# Patient Record
Sex: MALE | Race: WHITE | NOT HISPANIC OR LATINO | Employment: OTHER | URBAN - METROPOLITAN AREA
[De-identification: names, ages, dates, MRNs, and addresses within clinical notes are randomized per-mention and may not be internally consistent; named-entity substitution may affect disease eponyms.]

---

## 2024-06-11 ENCOUNTER — DOCUMENTATION (OUTPATIENT)
Dept: TRANSPLANT | Facility: HOSPITAL | Age: 64
End: 2024-06-11
Payer: COMMERCIAL

## 2024-06-11 ENCOUNTER — TELEPHONE (OUTPATIENT)
Dept: TRANSPLANT | Facility: HOSPITAL | Age: 64
End: 2024-06-11
Payer: COMMERCIAL

## 2024-06-11 VITALS — WEIGHT: 236.99 LBS | HEIGHT: 68 IN | BODY MASS INDEX: 35.92 KG/M2

## 2024-06-11 DIAGNOSIS — Z01.818 PRE-TRANSPLANT EVALUATION FOR KIDNEY TRANSPLANT: Primary | ICD-10-CM

## 2024-06-11 NOTE — TELEPHONE ENCOUNTER
Do you have difficulty reading or writing in English?   no   What is the primary cause of your kidney disease?   High blood pressure,diabetes   Are you currently on dialysis?   yes  If yes, what days do you have your dialysis treatments?   Charles Ku,Sat  Have you received a transplant before?   no  If yes, what organ, and when and where was your transplant?   no  Have you been diagnosed with diabetes?    yes  Have you tested positive for hepatitis or HIV?   no  Have you ever been diagnosed with cancer?   no  If yes, what type of cancer, and when and where were you treated?   no  Do you have a history of a heart attack or stroke?  No  Are you currently or have you previously been seen by a mental health professional?   no  If yes, what is the name of your mental health provider?   no  Are you a current or former tobacco user?   no  Do you have history of alcohol abuse or dependence?   no  Do you have a history of illegal drug abuse or dependence?   no  Has anyone told you they're willing to donate their kidney to you?   no  Comments:   PCP-, NEPH-  Intake is complete,Evaluation scheduled for 10-4-24

## 2024-10-04 ENCOUNTER — APPOINTMENT (OUTPATIENT)
Dept: TRANSPLANT | Facility: HOSPITAL | Age: 64
End: 2024-10-04
Payer: COMMERCIAL

## 2025-01-17 ENCOUNTER — OFFICE VISIT (OUTPATIENT)
Facility: HOSPITAL | Age: 65
End: 2025-01-17
Payer: MEDICARE

## 2025-01-17 ENCOUNTER — DOCUMENTATION (OUTPATIENT)
Dept: TRANSPLANT | Facility: HOSPITAL | Age: 65
End: 2025-01-17
Payer: MEDICARE

## 2025-01-17 ENCOUNTER — APPOINTMENT (OUTPATIENT)
Facility: HOSPITAL | Age: 65
End: 2025-01-17
Payer: MEDICARE

## 2025-01-17 ENCOUNTER — SOCIAL WORK (OUTPATIENT)
Facility: HOSPITAL | Age: 65
End: 2025-01-17
Payer: MEDICARE

## 2025-01-17 ENCOUNTER — DOCUMENTATION (OUTPATIENT)
Facility: HOSPITAL | Age: 65
End: 2025-01-17

## 2025-01-17 ENCOUNTER — LAB (OUTPATIENT)
Dept: LAB | Facility: LAB | Age: 65
End: 2025-01-17
Payer: MEDICARE

## 2025-01-17 ENCOUNTER — DOCUMENTATION (OUTPATIENT)
Dept: TRANSPLANT | Facility: HOSPITAL | Age: 65
End: 2025-01-17

## 2025-01-17 ENCOUNTER — DOCUMENTATION (OUTPATIENT)
Facility: HOSPITAL | Age: 65
End: 2025-01-17
Payer: MEDICARE

## 2025-01-17 VITALS
BODY MASS INDEX: 37.62 KG/M2 | DIASTOLIC BLOOD PRESSURE: 71 MMHG | HEART RATE: 83 BPM | TEMPERATURE: 96.1 F | OXYGEN SATURATION: 95 % | WEIGHT: 248.2 LBS | HEIGHT: 68 IN | SYSTOLIC BLOOD PRESSURE: 127 MMHG

## 2025-01-17 VITALS
WEIGHT: 248.2 LBS | HEART RATE: 83 BPM | HEIGHT: 68 IN | SYSTOLIC BLOOD PRESSURE: 127 MMHG | OXYGEN SATURATION: 95 % | BODY MASS INDEX: 37.62 KG/M2 | DIASTOLIC BLOOD PRESSURE: 71 MMHG | TEMPERATURE: 96.1 F

## 2025-01-17 DIAGNOSIS — N18.6 ESRD (END STAGE RENAL DISEASE) (MULTI): ICD-10-CM

## 2025-01-17 DIAGNOSIS — Z12.5 ENCOUNTER FOR SCREENING FOR MALIGNANT NEOPLASM OF PROSTATE: ICD-10-CM

## 2025-01-17 DIAGNOSIS — R79.89 OTHER SPECIFIED ABNORMAL FINDINGS OF BLOOD CHEMISTRY: ICD-10-CM

## 2025-01-17 DIAGNOSIS — Z51.81 ENCOUNTER FOR THERAPEUTIC DRUG LEVEL MONITORING: ICD-10-CM

## 2025-01-17 DIAGNOSIS — Z01.818 PRE-TRANSPLANT EVALUATION FOR KIDNEY TRANSPLANT: Primary | ICD-10-CM

## 2025-01-17 DIAGNOSIS — Z01.818 PRE-TRANSPLANT EVALUATION FOR KIDNEY TRANSPLANT: ICD-10-CM

## 2025-01-17 DIAGNOSIS — N18.6 END STAGE RENAL DISEASE (MULTI): ICD-10-CM

## 2025-01-17 DIAGNOSIS — N18.6 ESRD (END STAGE RENAL DISEASE) (MULTI): Primary | ICD-10-CM

## 2025-01-17 LAB
ABO GROUP (TYPE) IN BLOOD: NORMAL
ALBUMIN SERPL BCP-MCNC: 4.4 G/DL (ref 3.4–5)
ALP SERPL-CCNC: 79 U/L (ref 33–136)
ALT SERPL W P-5'-P-CCNC: 24 U/L (ref 10–52)
AMYLASE SERPL-CCNC: 159 U/L (ref 29–103)
APPEARANCE UR: CLEAR
AST SERPL W P-5'-P-CCNC: 20 U/L (ref 9–39)
BILIRUB DIRECT SERPL-MCNC: 0.1 MG/DL (ref 0–0.3)
BILIRUB SERPL-MCNC: 0.5 MG/DL (ref 0–1.2)
BILIRUB UR STRIP.AUTO-MCNC: NEGATIVE MG/DL
BUN SERPL-MCNC: 69 MG/DL (ref 6–23)
C PEPTIDE SERPL-MCNC: 13.2 NG/ML (ref 0.7–3.9)
CHOLEST SERPL-MCNC: 106 MG/DL (ref 0–199)
CHOLESTEROL/HDL RATIO: 2.1
COLOR UR: ABNORMAL
CREAT SERPL-MCNC: 6.33 MG/DL (ref 0.5–1.3)
EBV EA IGG SER QL: NEGATIVE
EBV NA AB SER QL: POSITIVE
EBV VCA IGG SER IA-ACNC: POSITIVE
EBV VCA IGM SER IA-ACNC: NEGATIVE
EGFRCR SERPLBLD CKD-EPI 2021: 9 ML/MIN/1.73M*2
ERYTHROCYTE [DISTWIDTH] IN BLOOD BY AUTOMATED COUNT: 13.8 % (ref 11.5–14.5)
EST. AVERAGE GLUCOSE BLD GHB EST-MCNC: 189 MG/DL
GLUCOSE UR STRIP.AUTO-MCNC: ABNORMAL MG/DL
HBA1C MFR BLD: 8.2 %
HBV CORE AB SER QL: NONREACTIVE
HBV SURFACE AB SER-ACNC: <3.1 MIU/ML
HBV SURFACE AG SERPL QL IA: NONREACTIVE
HCT VFR BLD AUTO: 34.4 % (ref 41–52)
HCV AB SER QL: NONREACTIVE
HDLC SERPL-MCNC: 49.9 MG/DL
HGB BLD-MCNC: 11 G/DL (ref 13.5–17.5)
HIV 1+2 AB+HIV1 P24 AG SERPL QL IA: NONREACTIVE
HOLD SPECIMEN: NORMAL
HYALINE CASTS #/AREA URNS AUTO: ABNORMAL /LPF
INR PPP: 0.9 (ref 0.9–1.1)
KETONES UR STRIP.AUTO-MCNC: NEGATIVE MG/DL
LEUKOCYTE ESTERASE UR QL STRIP.AUTO: NEGATIVE
MCH RBC QN AUTO: 29.1 PG (ref 26–34)
MCHC RBC AUTO-ENTMCNC: 32 G/DL (ref 32–36)
MCV RBC AUTO: 91 FL (ref 80–100)
MUCOUS THREADS #/AREA URNS AUTO: ABNORMAL /LPF
NITRITE UR QL STRIP.AUTO: NEGATIVE
NON-HDL CHOLESTEROL: 56 MG/DL (ref 0–149)
NRBC BLD-RTO: 0 /100 WBCS (ref 0–0)
PH UR STRIP.AUTO: 6 [PH]
PHOSPHATE SERPL-MCNC: 5.3 MG/DL (ref 2.5–4.9)
PLATELET # BLD AUTO: 234 X10*3/UL (ref 150–450)
PROT SERPL-MCNC: 7.3 G/DL (ref 6.4–8.2)
PROT UR STRIP.AUTO-MCNC: ABNORMAL MG/DL
PROTHROMBIN TIME: 10.6 SECONDS (ref 9.8–12.8)
RBC # BLD AUTO: 3.78 X10*6/UL (ref 4.5–5.9)
RBC # UR STRIP.AUTO: ABNORMAL /UL
RBC #/AREA URNS AUTO: ABNORMAL /HPF
RH FACTOR (ANTIGEN D): NORMAL
SP GR UR STRIP.AUTO: 1.01
TREPONEMA PALLIDUM IGG+IGM AB [PRESENCE] IN SERUM OR PLASMA BY IMMUNOASSAY: NONREACTIVE
UROBILINOGEN UR STRIP.AUTO-MCNC: NORMAL MG/DL
VARICELLA ZOSTER IGG INDEX: 6.3 IA
VZV IGG SER QL IA: POSITIVE
WBC # BLD AUTO: 8.1 X10*3/UL (ref 4.4–11.3)
WBC #/AREA URNS AUTO: ABNORMAL /HPF

## 2025-01-17 PROCEDURE — 86901 BLOOD TYPING SEROLOGIC RH(D): CPT

## 2025-01-17 PROCEDURE — 84100 ASSAY OF PHOSPHORUS: CPT

## 2025-01-17 PROCEDURE — 81001 URINALYSIS AUTO W/SCOPE: CPT

## 2025-01-17 PROCEDURE — 86663 EPSTEIN-BARR ANTIBODY: CPT

## 2025-01-17 PROCEDURE — 84681 ASSAY OF C-PEPTIDE: CPT

## 2025-01-17 PROCEDURE — 82565 ASSAY OF CREATININE: CPT

## 2025-01-17 PROCEDURE — 87340 HEPATITIS B SURFACE AG IA: CPT

## 2025-01-17 PROCEDURE — 84520 ASSAY OF UREA NITROGEN: CPT

## 2025-01-17 PROCEDURE — 80076 HEPATIC FUNCTION PANEL: CPT

## 2025-01-17 PROCEDURE — 86706 HEP B SURFACE ANTIBODY: CPT

## 2025-01-17 PROCEDURE — 99205 OFFICE O/P NEW HI 60 MIN: CPT

## 2025-01-17 PROCEDURE — 99215 OFFICE O/P EST HI 40 MIN: CPT

## 2025-01-17 PROCEDURE — 86644 CMV ANTIBODY: CPT

## 2025-01-17 PROCEDURE — 82150 ASSAY OF AMYLASE: CPT

## 2025-01-17 PROCEDURE — 86704 HEP B CORE ANTIBODY TOTAL: CPT

## 2025-01-17 PROCEDURE — 83718 ASSAY OF LIPOPROTEIN: CPT

## 2025-01-17 PROCEDURE — 85027 COMPLETE CBC AUTOMATED: CPT

## 2025-01-17 PROCEDURE — 86803 HEPATITIS C AB TEST: CPT

## 2025-01-17 PROCEDURE — 86780 TREPONEMA PALLIDUM: CPT

## 2025-01-17 PROCEDURE — 82465 ASSAY BLD/SERUM CHOLESTEROL: CPT

## 2025-01-17 PROCEDURE — 86787 VARICELLA-ZOSTER ANTIBODY: CPT

## 2025-01-17 PROCEDURE — 80349 CANNABINOIDS NATURAL: CPT

## 2025-01-17 PROCEDURE — 86825 HLA X-MATH NON-CYTOTOXIC: CPT | Mod: OUT | Performed by: TRANSPLANT SURGERY

## 2025-01-17 PROCEDURE — 3008F BODY MASS INDEX DOCD: CPT | Performed by: STUDENT IN AN ORGANIZED HEALTH CARE EDUCATION/TRAINING PROGRAM

## 2025-01-17 PROCEDURE — 86829 HLA CLASS I/II ANTIBODY QUAL: CPT | Mod: OUT | Performed by: TRANSPLANT SURGERY

## 2025-01-17 PROCEDURE — 87389 HIV-1 AG W/HIV-1&-2 AB AG IA: CPT

## 2025-01-17 PROCEDURE — 99215 OFFICE O/P EST HI 40 MIN: CPT | Performed by: STUDENT IN AN ORGANIZED HEALTH CARE EDUCATION/TRAINING PROGRAM

## 2025-01-17 PROCEDURE — 86665 EPSTEIN-BARR CAPSID VCA: CPT

## 2025-01-17 PROCEDURE — 81379 HLA I TYPING COMPLETE HR: CPT | Mod: OUT | Performed by: TRANSPLANT SURGERY

## 2025-01-17 PROCEDURE — 83036 HEMOGLOBIN GLYCOSYLATED A1C: CPT

## 2025-01-17 PROCEDURE — 86900 BLOOD TYPING SEROLOGIC ABO: CPT

## 2025-01-17 PROCEDURE — 85610 PROTHROMBIN TIME: CPT

## 2025-01-17 PROCEDURE — 86664 EPSTEIN-BARR NUCLEAR ANTIGEN: CPT

## 2025-01-17 RX ORDER — LABETALOL 300 MG/1
300 TABLET, FILM COATED ORAL 2 TIMES DAILY
COMMUNITY

## 2025-01-17 RX ORDER — INSULIN GLARGINE 100 [IU]/ML
20 INJECTION, SOLUTION SUBCUTANEOUS EVERY 24 HOURS
COMMUNITY

## 2025-01-17 RX ORDER — ISOSORBIDE MONONITRATE 30 MG/1
30 TABLET, EXTENDED RELEASE ORAL DAILY
COMMUNITY

## 2025-01-17 RX ORDER — DULAGLUTIDE 0.75 MG/.5ML
0.75 INJECTION, SOLUTION SUBCUTANEOUS WEEKLY
COMMUNITY

## 2025-01-17 RX ORDER — ROSUVASTATIN CALCIUM 10 MG/1
10 TABLET, COATED ORAL DAILY
COMMUNITY

## 2025-01-17 RX ORDER — TORSEMIDE 20 MG/1
20 TABLET ORAL DAILY
COMMUNITY

## 2025-01-17 ASSESSMENT — PATIENT HEALTH QUESTIONNAIRE - PHQ9
1. LITTLE INTEREST OR PLEASURE IN DOING THINGS: NOT AT ALL
SUM OF ALL RESPONSES TO PHQ9 QUESTIONS 1 AND 2: 0
2. FEELING DOWN, DEPRESSED OR HOPELESS: NOT AT ALL

## 2025-01-17 ASSESSMENT — COLUMBIA-SUICIDE SEVERITY RATING SCALE - C-SSRS
2. HAVE YOU ACTUALLY HAD ANY THOUGHTS OF KILLING YOURSELF?: NO
1. IN THE PAST MONTH, HAVE YOU WISHED YOU WERE DEAD OR WISHED YOU COULD GO TO SLEEP AND NOT WAKE UP?: NO
6. HAVE YOU EVER DONE ANYTHING, STARTED TO DO ANYTHING, OR PREPARED TO DO ANYTHING TO END YOUR LIFE?: NO

## 2025-01-17 ASSESSMENT — PAIN SCALES - GENERAL
PAINLEVEL_OUTOF10: 0-NO PAIN
PAINLEVEL_OUTOF10: 0-NO PAIN

## 2025-01-17 NOTE — PROGRESS NOTES
"ENCOUNTER    Visit Type Initial Visit  Location: [place] [address]    What is your primary language? English  Other languages/fluency?     Barriers to Communication / Understanding:   [] Language [] Vision [] Hearing [] Other      []  Present     Accompanied By: Wife, Melanie    Organ For Transplant:  Kidney    Ethnicity:  White    ADLs Fully Independent      Instrumental ADLs Fully Independent      Level of Activity Active      DME: Denied    Knowledge of Health Good    Why Do You Have End Stage Organ Disease   Pt reported the cause of his kidney disease is \"diabetes and the COVID shot.\"     When did you become aware of your diagnosis?   Pt reported he became aware 4 years ago.    Knowledge of Transplant / VAD:  Yes Patient Is Able To Make An Informed Decision    Patient Understands the Risks of Transplant / VAD   Yes Rejection  Yes Infection Yes Complications  Yes Low Risk of Death    Patient Understands Recovery and Follow-Up from Transplant / VAD  Yes Length Of State Yes Appointments  Yes Labs  Yes Rehabilitation    Patient Has Identified Goals of Transplant / VAD Yes  Pt reported a goal of transplant is \"to alleviate the dialysis and live a healthier life.\"     What is your biggest concern?   Pt denied any concerns related to transplant at this time.     If previously denied listing, why were you denied? Please describe that experience and how it is different now.   [facility] - Listed  [facility] - Evaluation    Overall Compliance  Good       Amount of Daily Medications: 6   Compliance With Medications Good    Managed By Patient  Organized By: [employer]     Have you ever changed the way you take a medication without talking to the doctor?   No     Understanding Of Medication  Good  Compliance With Appointments Good    What has your relationship been like with previous medical providers?      Fluid Restriction:   Yes [x] Compliant   38 oz    Dialysis:  [x] What Dialysis Center   [facility] [x] Began   April " "2024      [] In Center [] Home Hemo [] Peritoneal       Attendance:  Treatment Attendance Good  Treatment Time T, Th, Sat - Runs for 4 hrs    [] Shortened Treatments [] Rescheduled Treatments [] Missed Treatments          Diet:   Patient is compliant with renal restrictions     Patient is compliant with low sodium diet       # of Binders:  [] # of Binders per meal [] Meals per day      []  # of Binders per Snack [] Snacks per day       Pancreas:  [] Checks blood sugar      times/day     Hypoglycemic Episodes  Outside Interventions      Liver:  Is Lactulose prescribed  Dose:   Timesper day:  Is patient compliant       SOCIAL HISTORY  No       Years of Service      Were you involved in active combat?                 Do you receive benefits through the [facility]?        Education:  education: Assoc. Degree Engineering    Literate Yes   Computer literate Yes  Internet access Yes       Sources of Income: SSI ($2,300 monthly)  Patient's Current Employment    [] Full-Time [] Part-Time [] Unemployed [x] Retired     []  None [] Not working by choice [] Not working disabled     [] Short Term Leave   [] Other   Employment History   Construction    Will patient have paid status from employment during recovery     Spouse / SO Current Employment     Will spouse / SO have paid status from employment during recovery     Other Sources of Income Total Household Income $28,000 yearly      Does patient have financial concerns   Yes     Is patient able to meet current monthly expenses   Yes    Resources:    Patient was provided information on transplant fundraising       Insurance:  Primary Insurance Medicare Parts A & B    Secondary Insurance     Prescription Coverage Copay cost per month $100    Comments:     FAMILY SYSTEM    Single    Yes How long 38 years  Describe Relationship \"Excellent\"    How long    Describe Relationship    When      When  In a Relationship   How Long  Describe " "Relationship    Spouse / SO Name Melanie  Age 64  Health  \"Excellent\"  Other Caregiver Responsibilities  Spouse / Significant others reaction to donation    Children:  Yes # Biological (1 son, 2 daughters)   # Adopted    # Step Children    :     Child #1 Name Michael  Age 35  Health \"Excellent\"    Lives Local  How Much Contact Daily    Child #2 Name Yolanda  Age 33  Health \"Good\"    Lives Local  How Much Contact Daily    Child #3 Name Yumiko Age 28 Health \"Good\"    Lives Local  How Much Contact Daily    Parents:  Raised By Both Biological Parents    Did the patient have contact with the other parent     Mother  Yes Age 67  Cause of Death Stroke  Father  Yes Age 67  Cause of Death Cancer    Living Parent #1  Living Parent #2    Additional Information    Siblings:  [x] # Biological (2 brothers, 1 sister total; 1 brother ) [] # Half Siblings [] # Step Siblings     Sibling #1  Sibling #2    Support & Recovery Plan:  Yes Adequate    Primary Support:  Name Melanie Phone [phone]  Age 64  Relationship to Patient Wife  If employed, can they take time off work Not working   If so, is it paid time off    If not, will this impact your finances    Did they attend education classes Yes   Do they have other caregiver responsibilities (child or eldercare) No   Do they have their own conditions which may prevent them from providing care for you No  (Medical, psychological, physical limitations)    Are they available on short notice Yes   Are they reliable Yes   Are they responsible Yes   Are they able to understand and process new information Yes   Do they have reliable transportation or will you allow them to use your vehicle Yes   Are they currently involved in your care Yes   Comments    Secondary Support:  Name Yolanda Phone [phone] Age 33  Relationship to Patient Daughter  If employed, can they take time off work Yes   If so, is it paid time off    If not, will this impact your finances  " "  Did they attend education classes No   Do they have other caregiver responsibilities (child or eldercare) Yes   Pt reported his MIREILLE can care for the child.   Do they have their own conditions which may prevent them from providing care for you No  (Medical, psychological, physical limitations)    Are they available on short notice Yes   Are they reliable Yes   Are they responsible Yes   Are they able to understand and process new information Yes   Do they have reliable transportation or will you allow them to use your vehicle Yes   Are they currently involved in your care Yes   Comments  Pt reported his daughter would be able to stay in [place] for 6 weeks post-transplant.     Alternate Support  Alternate Support    How comfortable are you asking for and/or receiving help?      Housing:  Yes Adequate Owns home  Type of Home House  Indicate steps into home/bed/bathroom: 0  Who all lives with Pt: Wife  Distance to [facility] 5 hours  Pets 0  Pt reported he thinks traveling from his home to [facility] within 6 hours is manageable. Pt shared he would be able to stay in the area for 2-3 months after surgery.     Transportation:  Yes Adequate  # Licensed Drivers in the Home 2  Does Patient Drive Yes If not, why  # Reliable Vehicles 2  Does Patient use Public Transportation No  Does Patient use Medical Transportation No  Comments      MENTAL HEALTH    Cognition:  No impairment observed / reported    The patient reports their mood as \"good.\"    Reported Mental Health Diagnosis   Denied  Family History of Mental Health Diagnosis   Denied  What are patient psychosocial stressors   Pt denied any current stressors.     Current Medications:  No  Mental Health Meds  Denied  Rx'd by   Sleep Meds  Denied  Rx'd by   Pain Meds  Denied  Rx'd by     OTC Meds   Denied  Past Mental Health Medications   Denied      Counseling Never  Pt declined  resources at this time.     Has patient ever been hospitalized for mental health reasons No   Was " the hospitalization voluntary  Duration   Where    When  Describe situation    Discharge Plan for Follow Up  Was Discharge Plan Completed   Referral to Transplant Psych   No       Suicide Assessment:  History of Suicide Ideation No  [] Timeframe     Frequency   Plan Created  Intent to Follow Through  Outcome      History of Suicide Attempt No       History of Suicidal Ideation in the past 3 months No   Intensity   Duration     Description of Plan      Plans thought of:   Intent to Follow Through:     Current Plan for Safety    Plan for Follow-Up        In the past 6 months, has anyone physically, sexually, or emotionally abused you?   No     Ever in the past, has anyone physically, sexually, or emotionally abused you?   No    Patient's Reported Trauma History      Does Patient Feel Safe in Home   Yes        What are patient's coping behaviors   Pt reported spending time with family, gardening, and working around the house as coping behaviors.     Do you have any activities that you're unable to do now, that you hope to return to after transplant?   Pt reported he is looking forward to traveling and having more energy.     Buddhist / Spirituality   Synagogue    Do you have any Baptist, ethical, or personal objections to accepting blood products, surgery, and/or transplant?   No      Thought Processes:   Attitude toward interviewer Cooperative and Appropriate    Eye Contact Patient maintained good eye contact throughout appointment    Appearance The patient was neatly groomed, appropriately dressed and adequately nourished    Affect Appropriate    Thought Process Appropriate      Substance Use /Abuse History:    Current Tobacco User No  Patient uses   Tobacco Frequency   For How Long      Former Tobacco User No  Describe past tobacco use and date quit      Current Alcohol User No  Type of Alcohol Used   Amount  Frequency   Pattern of Alcohol Use      If alcohol use disorder is suspected, ask the following:    Continued to use the substance despite being told the substance is affecting their health    History of problems at work, school or home due to substance use    History of DUI's/legal issues related to substance use       Former Alcohol User Yes  Describe past alcohol use and date quit  Pt reported his last alcohol use was 4 years ago. Pt shared he would drink socially and denied any heavy use.     Has patient ever gone to CD treatment   If yes, When, Where and What type of Program  Attends AA meetings    Sponsor  Do support people drink alcohol   If yes, describe support people's use  Is alcohol kept in the home   Does Patient need to sign a CD contract       Current Illegal / Unprescribed Drug User No  Type of Illegal Drug Used   Frequency  Pattern of Drug Use      Illegal / Unprescribed Drug #2  Type of Illegal Drug Used   Frequency  Pattern of Drug Use      Continue to use the substance despite being told the substance is affecting their health    History of problems at work, school or home due to substance use      Former Illegal / Unprescribed Drug User No  Describe past illegal drug use and date quit      Has patient ever gone to CD treatment   If yes, When, Where and What type of Program   Attends AA/NA  meetings    Is patient on a Methadone / Suboxone regiment   Do support people use illegal drugs   If yes, describe support people's use  Are illegal drugs kept in the home   Does Patient need to sign a CD contract       Prescription Drug Abuse:  No Has patient experienced feelings of addiction  No Has patient experienced symptoms of withdrawal  No Has patient experienced any side effects? e.g.  hallucinations or delusions    Does Patient Meet the Criteria for Alcohol Use Disorder No Diagnosis  Does Patient Meet [facility]OTC guidelines Yes  Does Patient Meet the Criteria for Illegal Drug Use Disorder No Diagnosis  Does Patient Meet [facility]OTC guidelines Yes    [facility] Substance Relapse Risk Factors   DSM-5 Severity  "Factors:       LEGAL ISSUES  No Arrests  No Currently probation or parole No   FCI No  When   How long   Where       Citizenship:   Where were you born? [place]   If outside of [place], where?   What year did you move to the [place]?     Yes [place] Citizen   Green Card  Visa    Any outstanding citizenship concerns?      Advance Directives: Yes  HCPOA   Living Will   Who is the proxy? Wife, Melanie    Pt reported he is going to be updated his documents soon and will provide a copy once that is completed.      Guardian:      JOSE BAZZI met with Pt and Pt's wife, Melanie, for initial psychosocial assessment. Pt was pleasant and engaged. Pt reported he has Medicare Parts A & B for insurance. Pt showed a good understanding of the risks of transplant and recovery process. Pt expressed having financial concerns at this time. Pt reported the cause of his kidney disease is \"diabetes and the COVID shot.\" Pt reported a goal of transplant is \"to alleviate the dialysis and live a healthier life.\" Pt reported good compliance with medications, appointments, and dialysis treatments. Pt listed his wife, Melanie, as primary support and his daughter, Yolanda, as secondary support. Pt reported both supports are adequate and will be able to stay in [place] for several weeks post-transplant. Pt reported his mood as \"good.\" Pt denied any MH history. Pt scored a 6 on the PHQ-9, indicating mild clinical depression. Pt scored a 2 on the LUI-7, indicating minimal clinical anxiety. Pt denied any current tobacco, alcohol, or illicit drug use. Pt denied any past tobacco or illicit drug use. Pt reported his last alcohol use was 4 years ago. Pt shared he would drink socially and denied any heavy use. Pt scored a 4 on the SIPAT, indicating Pt is an excellent candidate for transplant. SW would recommend listing Pt while monitoring Pt's financial situation.     PLAN  SW to meet with Pt annually.    "

## 2025-01-17 NOTE — PROGRESS NOTES
Eval Clinic Note:  Patient attended evaluation appts on 01/17/25 with Dr. Dr. Rosales and Dr. Ng.  Medications and allergies reviewed with patient.  Patient presented to appointment with wife.  Patient ambulated independently.  History:  Patient has a history of DM, HTN, CAD.  Dialysis: Patient is on HD on TTS since 4/2024  Testing completed recently: scope, CT AP, cardiac testing at [facility]  Testing needed for evaluation:  See AVS from visit.  Will ensure records are on file.  Patient is scheduled for NM stress in 2 weeks.  Listing Consent: KDPI, DCD, Hepatitis B, and Hepatitis C.  Comments:  Provided patient with my contact info for questions and concerns.       LISTING EDUCATION    Patient educated regarding the following prior to placement on the transplant waiting list:  The patient?s medical condition, prognosis, and treatment plan.  The expectations and patient responsibilities while on the waiting list, including:  Keeping the transplant center informed of any changes in contact information or insurance coverage  Notifying the transplant center of any changes in medical status  Required testing and/or re-evaluation appointments while awaiting transplant  An overview of the surgical procedure, including potential risks and alternatives.  Information regarding what to expect during the inpatient admission and recovery period.  A discussion regarding organ offers and types of potential donors, including potential risks that may be associated with specific types of donors that could affect the success of the transplant or the health of the patient.  The right to refuse transplantation.     Patient was given the opportunity to have questions answered. Patient was provided a copy of the informed consent for transplant listing.    Education provided by:  Transplant Coordinator: Michelle Street RN  Transplant Physician: Dr. Bobby CRUZ    Signed listing informed consent received? 01/17/25  Patient agrees to be  listed for the following:  KDPI > 85% [yes]  Donors After Circulatory Death (DCD) [yes]  Donors with a Positive Core Antibody for Hepatitis B [yes]  Donors with Hepatitis C Virus to recipients with hepatitis C [no]  Donors with Hepatitis C Virus to Negative hepatitis C recipients [yes]    Patient will be discussed at an upcoming selection committee to determine eligibility to be placed on the [facility] waiting list.

## 2025-01-17 NOTE — PATIENT INSTRUCTIONS
Thank You for coming to [facility] [employer].  You are currently in evaluation for kidney transplant.  In order to be eligible to be placed on the wait list you must complete certain tests and appointments.  The following tests/appointments will be scheduled for you:    []  EKG  []  Echocardiogram  []  Stress test - scheduled in 2 weeks  []  CT Cardiac Score  []  CT Abdomen and Pelvis  []  Chest X-Ray  []  Other     []  Nephrology Consult  []  Surgeon Consult  []  Cardiology Consult  []  Social Work Appointment  []  Transplant Psychologist Consultation  [x]  Financial Counselor Telephone Appointment  []  Dietitian Appointment (optional)  []  Weight Management Referral    Please complete the following testing with your primary care doctor:    []  Colonoscopy - due 2029        Please call Michelle Street RN, Pre-Kidney Transplant Coordinator, at [phone] if you have any questions.

## 2025-01-17 NOTE — PROGRESS NOTES
Kidney Patient New Evaluation Summary    Date of appointment: 2025    Name: Lanre Piedra    : 1960    MRN: [id number]    Diagnosis: Diabetes Mellitus - Type II and Hypertensive Nephrosclerosis      Phase: New Eval    Referring Nephrologist:   Dr. Hugo  HD Unit: [facility]   Dialysis Start: 2024  Access:       Days:  TTS    PCP:  Dr. Stroud      Endocrinologist:     Medical History/Hospitalizations:   T2DM, HTN  Surgical History: No past surgical history on file.  Donors: No      Test/Consult Impression Next Scheduled Date   CXR No results found for this or any previous visit.    EKG No results found for this or any previous visit from the past 1095 days.        Echo LVEF 62%, no significant valvular disease   Per notes    CT Cardiac Score No results found.     NM Stress Test No results found for this or any previous visit.     Cardiac MRI No results found for this or any previous visit.     Left Heart Catheterization Coronary Findings Diagnostic Dominance: Right  Left Main: The vessel is angiographically normal.  Left Anterior Descending: Mid LAD lesion is 60% stenosed. First Diagonal Branch: 1st Diag lesion is 100% stenosed. Second Diagonal Branch: The vessel is small. 2nd Diag lesion is 90% stenosed.  Left Circumflex: Mid Cx lesion is 40% stenosed.  Right Coronary Artery: Prox RCA lesion is 30% stenosed. Mid RCA lesion is 30% stenosed.     Cardiology last visit impression  No results found.    Pulmonary Function Test No results found for this or any previous visit.    CT Abd/Pelvis Result Date: 2024  Moderate calcifications of the infrarenal aorta and common iliac arteries. Mild calcifications of the external iliac arteries. No significant change in left lower pole renal calculus. END OF IMPRESSION        [facility] submits this DICOM format image data and final report to the [facility], an independent secure electronic health information exchange, on a reciprocally searchable  basis (with patient authorization) for a minimum of 12 months after exam date.      Colonoscopy  No results found for this or any previous visit.     Pap N/A    Mammogram N/A    Other:

## 2025-01-17 NOTE — PROGRESS NOTES
Chief Complaint: Patient presents for kidney transplant evaluation    History of Present Illness:  Lanre Piedra  is a 64 y.o. male presents with ESRD from ***.    Hemodialysis: *** Monday, Wednesday, Friday.    ROS: ***oliguric, no urinary retention/hematuria/recurrent UTIs/nephrolithiasis.   Disease Etiology: *** diabetic nephropathy and hypertensive nephropathy.   Disease Complications: *** congestive heart failure, dyslipidemia and hypertension.   PVD: *** YES/NO  Prior Malignancy: *** YES/NO     Past Medical History:  CAD - moderate LAD and severe diagonal via C 24  HTN  HLD  ESRD  DM  Obesity    Past Surgical History:  Lap appy  Umbilical hernia    Social History:    Review of Systems:  Cardiac: Denies chest pain, palpitations  : Normal urine output. Denies history of gross hematuria, nephrolithiasis, urinary retention, or recurrent UTIs.  Vascular: Denies personal or familial history of DVT/PE. No active claudication or non-healing LE wounds.  Functional Status: Can walk up 2 flights of stairs  Transfusions: *** None  Pregnancy: *** Not applicable  Prior transplant: Not applicable    Family History:  Mother: ***  Father: ***  Sibling: ***    Physical Exam:  Gen: A+OX3; NAD  HEENT: PERRL, sclera anicteric, MMM  Cardiac: RRR  Chest: Normal inspiratory effort  Abdomen: S/NT/ND.  Ext: No LE edema  Vascular: ***2+ palpable femoral pulses  Psychiatric: Normal mood, affect    Assessment/Plan:  - The patient is an ***excellent candidate for kidney transplantation.  - Routine age/gender based screening  - Cardiac testing per protocol: ECHO/stress test  - Non-contrast CT scan abdomen/pelvis  ***- Poor functional status, recommend 6MWT to determine transplant candidacy***    Transplant Education:    I had a discussion with this patient regarding 1 year graft and patient survival statistics following renal transplantation for both living and  donor allograft recipients. This data included  [facility] data compared to National data readily available for review on https://www.SRTR.org. The patient also had attended the kidney transplant education class provided by the transplant institute.     The difference between allograft function was discussed comparing living donor, KDPI 0-85%, and >85% kidneys.     Further discussion included:  -The transplant selection committee process.  -The need for lifelong immunosuppressive therapy, and the side effects of these medications including the risk of infections, cancer, and lymphoma.  -The wait list time approximately is 5 years or more for  donor transplants and the statistical superiority of a living donor.     -Using identified donors with risk criteria for transmission of infection  -The possibility of utilizing  donors with known HCV antibody and/or HERMINIA positivity and post-transplant treatment/surveillance protocol  -Potential transmission of infectious disease from any  donors, as well as living donors.   -The possibility of transmission of tumors and infections via the transplanted organ.  -The inability to completely test for all potential harmful tumors or infectious agents.  -The possibility of listing at multiple locations.     Surgical complications including need for reoperation(s) including but not limited to:  -Bleeding.  -Repair of leaks.  -Control of infection.  -Blood clots in the transplant vessels.  -Possible kidney transplant removal.     The medical complications including but not limited to:  -Death.  -Cardiac.  -Pulmonary.  -Infectious.  -Neurologic.  -Other Complications.     We also discussed how the kidney transplant could function:  -Non-function and possible kidney transplant removal within the first 3 to 6 months.  -Delayed graft function (dialysis needed after transplant).  -The potential of recurrence of kidney disease leading to kidney transplant graft loss.    Time Attestation:  I spent 60  minutes with the patient, over 50 minutes in counseling and education as outlined above.    Malissa Rosales MD

## 2025-01-17 NOTE — PROGRESS NOTES
"        TRANSPLANT NEPHROLOGY CONSULT :   KIDNEY TRANSPLANT RECIPIENT EVALUATION        SERVICE DATE: 01/17/2025     REASON FOR CONSULT/CHIEF COMPLAINT:    FOR KIDNEY TRANSPLANT RECIPIENT EVALUATION.    HPI:    Mr. Piedra is a 64 y.o. male with past medical history significant for : ESKD due to diabetes pm YULIET somce 4/2024, coronary artery disease, CRISTHIAN on CPAP and hypertension    HD start date 4/2024  Access: LUE radio cephalic AVF functioning well  EDW:  239 lbs  Typical UF: 1.5-3 L/session  No intra-dialytic complications    Type 2 diabetes - onset in his mid 50s  Microvascular complications:  s/p laser and injection, denies neuropathy, proteinuric CKD  ASCVD: CAD - did not require PCI-GERI/CABG  Treatment: Trulicity and basal insulin  Glycemic control: acceptable    Doing well. Denies recent infection/hospitalization.  Since being on dialysis he has been well and wants to get transplanted.  He is already listed at  but he also wants to get listed in [place] as someone he knows recommends this.    BLOOD TYPE:  B    Functional status :   Good  > 4 METs  Able to climb 2-3 flights of stairs    Urine output :   The patient makes urine output approximately 2 cups per day.    Potential Donor :  No    Last GFR /Creatinine:   No results found for: \"CREATININE\"   No results found for: \"GFRMALE\", \"NONUHFIRE\"    Hx of PRBC Transfusion  No      Recent Hospitalization/ED visit:  No    The patient is here for kidney transplant recipient evaluation. Mr. Piedra has had multiple complications from end stage severe renal disease including anemia, secondary hyperparathyroidism, and osteodystrophy. The patient is here today for an evaluation for kidney transplantation to improve quality of life and decrease the risk of cardiovascular disease, coronary artery disease and stroke.     The patient is doing well without complaints. Denied chest pain, shortness of breath, palpitation, dyspnea on exertion, dysuria, fever, nausea, " vomiting, diarrhea and flu-liked symptoms. No swelling of the extremities. No recent hospitalization or ED visit.      ROS:  Review of  14 systems was performed system by system. See HPI. Otherwise, the symptoms were negative.    PAST MEDICAL HISTORY: Please see HPI.  Type 2 DM  ESKD  Hypertension  Hyperlipidemia  Coronary artery disease    PAST SURGICAL HISTORY: Please see HPI.  Umbilical hernia surgery  Appendectomy    SOCIAL HISTORY: Please see our 's note for details.  Lives in [place] - [place]  If needed, he can get a helicopter.  His support person is his spouse.  Denies smoking, alcohol, recreational drug use.    Social History     Socioeconomic History    Marital status: Unknown     Spouse name: Not on file    Number of children: Not on file    Years of education: Not on file    Highest education level: Not on file   Occupational History    Not on file   Tobacco Use    Smoking status: Not on file    Smokeless tobacco: Not on file   Substance and Sexual Activity    Alcohol use: Not on file    Drug use: Not on file    Sexual activity: Not on file   Other Topics Concern    Not on file   Social History Narrative    Not on file     Social Drivers of Health     Financial Resource Strain: Not on file   Food Insecurity: Patient Declined (6/26/2024)    Received from [facility]    Hunger Vital Sign     Worried About Running Out of Food in the Last Year: Patient declined     Ran Out of Food in the Last Year: Patient declined   Transportation Needs: Patient Declined (6/26/2024)    Received from [facility]    PRAPARE - Transportation     Lack of Transportation (Medical): Patient declined     Lack of Transportation (Non-Medical): Patient declined   Physical Activity: Not on file   Stress: Not on file   Social Connections: Not on file   Intimate Partner Violence: Unknown (8/30/2024)    Received from [facility]    Intimate Partner Violence     Is anyone physically, emotionally, or financially hurting you?:  "Unable to ask at this time   Housing Stability: Unknown (6/26/2024)    Received from [facility]    Housing Stability Vital Sign     Unable to Pay for Housing in the Last Year: Patient declined     Number of Times Moved in the Last Year: Not on file     Homeless in the Last Year: Not on file       FAMILY HISTORY:  Father had esophageal CA in his 60s - passed away  Mother had breast CA in her 60s - passed away    MEDICATION LIST:  No current outpatient medications    ALLERGY  Not on File    PHYSICAL EXAM:    Visit Vitals  /71   Pulse 83   Temp 35.6 °C (96.1 °F) (Temporal)   Ht 1.727 m (5' 8\")   Wt 113 kg (248 lb 3.2 oz)   SpO2 95%   BMI 37.74 kg/m²   BSA 2.33 m²        General Appearance - NAD, Good speech, oriented and alert  HEENT - Supple. Not pale. No jaundice.   CVS - RRR. Normal S1/S2. No murmur, click , rub or gallop  Lungs- clear to auscultation bilaterally  Abdomen - soft , not tender, no guarding, no rigidity.  Normal bowel sounds. No masses and ascites.   Musculoskeletal /Extremities - no edema. Full ROM. No joint tenderness.   Neuro/Psych - appropriate mood and affect. Motor power V/V all extremities. CN I -XII were grossly intact.  Skin - No visible rash  Dialysis access :  LUE AVF is clean, dry and intact. No signs of infection.    LABS:    No results found for: \"WBC\", \"HGB\", \"HCT\", \"PLT\", \"CHOL\", \"TRIG\", \"HDL\", \"LDLDIRECT\", \"ALT\", \"AST\", \"NA\", \"K\", \"CL\", \"CREATININE\", \"BUN\", \"CO2\", \"TSH\", \"PSA\", \"INR\", \"GLUF\", \"HGBA1C\", \"ALBUR\"  CT A/P non-contrast 6/30/24    Chest Base: Bibasilar atelectasis/scarring. Coronary calcifications. Mitral annulus calcification.     Liver/Biliary Tract: Unremarkable.     Pancreas: Unremarkable.     Spleen: Unremarkable.     Adrenals: Unremarkable.     Kidneys and Collecting Systems: No significant change in left lower pole calculus measuring approximately 1 cm. The left lower pole calyx is mildly dilated similar to prior, but there is no hydronephrosis.     Lymph " Nodes: No lymphadenopathy.     Vessels: Moderate atherosclerotic calcifications involving the infrarenal aorta, common iliac and internal iliac arteries as well as the common femoral and partially visualized superficial femoral arteries. The external iliac arteries have mild calcified    plaques.     GI Tract/Mesentery and Peritoneal Cavity: Post appendectomy. No dilated bowel loops.     Visualized Reproductive Organs: Prostatomegaly.     Bladder: Unremarkable.     Soft Tissues/Musculoskeletal: No acute abnormality.     PFT 5/24/24  Findings: FEV1 and FVC are normal.  The FEV1/FVC is preserved.  The diffusing capacity is normal.  The total lung capacity, via multiple breath nitrogen washout, is normal.     Impression:  Normal spirometry, lung volumes and diffusing capacity tests. Compared to last measured tests there is no signifcant change in spirometry values.     EKG:  Reviewed    Echocardiogram:   No results found for this or any previous visit from the past 1825 days.    Left heart cath 5/8/24  Mild to moderate major vessel and branch (D1 and D2) coronary artery disease. No intervention done.    Coronary Findings Diagnostic Dominance: Right   Left Main: The vessel is angiographically normal.   Left Anterior Descending: Mid LAD lesion is 60% stenosed. First Diagonal Branch: 1st Diag lesion is 100% stenosed. Second Diagonal Branch: The vessel is small. 2nd Diag lesion is 90% stenosed.   Left Circumflex: Mid Cx lesion is 40% stenosed.   Right Coronary Artery: Prox RCA lesion is 30% stenosed. Mid RCA lesion is 30% stenosed.     Colonoscopy 5/20/24  Findings:   Colon: There was a small sessile 4 mm ascending colon polyp removed with cold snare.  There was a small sessile 5 mm transverse colon polyp removed with cold snare.  There was mild sigmoid colon diverticulosis.  Small internal hemorrhoids seen on retroflexion.     Impression:   There was a small sessile 4 mm ascending colon polyp removed with cold snare.   There was a small sessile 5 mm transverse colon polyp removed with cold snare.  There was mild sigmoid colon diverticulosis.  Small internal hemorrhoids seen on retroflexion.     Pathology  FINAL DIAGNOSIS:   A.  Polyp, ascending colon, biopsy:    -Inflammatory polyp.     B.  Polyp, transverse colon, biopsy:    -Tubular adenoma.     ASSESSMENT AND PLAN:    After completion of taking history and physical examination, the patient seems to be a suitable candidate to proceed with the rest of transplant evaluation.    However, patient will need the following tests to determine the eligibility for kidney transplant per [facility]'s kidney transplant evaluation guideline :    - Standard cardiac evaluation - review record from [facility]  -Update cancer screening per age/sex  - Will need to complete the rest of work up per protocol      =========================================================================    The case will be presented at the selection committee at the Transplant [place].  The final decision from the committee will be sent out to notify the patient/primary care physician/ nephrologist. The above recommendations were discussed with the patient at length.     In addition, the following were also discussed:    - Risks and benefits of transplantation, both short-term and long-term    - Risk of primary graft non-function, DGF, SGF, rejection, primary disease recurrence, return to dialysis    - Risks of immunosuppression including infections, CA, CV risk    - Need for compliance with medications and medical care in general    - We reviewed the necessity of HBV vaccination and other recommended vaccines before a kidney transplant, following the Centers for Disease Control and Prevention(CDC)'s guidelines [https://www.cdc.gov/vaccines/schedules/downloads/adult/adult-combined-schedule.pdf]     Currently, the patient has received the following vaccines:      There is no  immunization history on file for this patient.      The patient expressed understanding of the above and wishes to proceed.  I answered all of his questions. I urged the patient to look for living donors.    - I have spent over 60 minutes with the patient, reviewing medical record, lab result , CXR result and other specialty's notes. More than 50% of the time was spent in counseling, explaining about the transplantation and answering the questions. I also reviewed the medical record, blood test results, imaging and previous studies which were obtained from the nephrologists. I also order the tests needed to complete the evaluation and I will review the results of those tests.    Thank you for this consultation. Please feel free to contact me for questions.    Norma Ng MD    Transplant Nephrology

## 2025-01-17 NOTE — PROGRESS NOTES
Patient attended education class and eval day on 1/17/25.  Patient was accompanied by the following as support, his wife. Patient was given educational materials both written and verbally regarding the kidney transplant process.  Patient was attentive and asked appropriate questions.  Evaluation consent forms were reviewed and signed.     PRE-TRANSPLANT EDUCATION  Patient received education regarding the following topics as part of their pre-transplant evaluation:  The evaluation process, including:   Transplant team members and roles    Required consultations and testing   Selection criteria and suitability for transplant   Listing process and receiving an organ offer   Psychosocial and financial considerations for a successful transplant   Patient responsibilities, including the necessity of adhering to a strict medical regimen  An overview of the surgical procedure   Potential medical, surgical, and psychosocial risks to transplantation, including:   Wound infection   Pneumonia   Blood clot formation   Organ rejection, failure, and possibility of re-transplantation   Lifetime immunosuppression therapy and associated risks   Arrhythmias and cardiovascular collapse   Multi-organ system failure   Death   Depression   Post-Traumatic Stress Disorder   Generalized anxiety, issues of dependence, and feelings of guilt  Available alternatives to transplantation  Donor risk factors that could affect the success of the transplant and the health of the patient, including:   Donor age   Donor medical and social history   Condition of the organ   Risk of wu cancer, HIV, Hepatitis B, Hepatitis C, or malaria if the infection is not detectable at the time of donation  Patient?s right to withdraw consent for transplantation at any time during the process  Transplants not performed in a Medicare-approved transplant center could affect the patient?s ability to have immunosuppression medication paid for under Medicare part  B.   Multiple listing options.    Patient was given the opportunity to have questions answered. Patient was provided a copy of the informed consent for transplant evaluation.    Signed evaluation informed consent received? 1/17/25

## 2025-01-20 LAB
AMPHETAMINES SERPL QL SCN: NEGATIVE NG/ML
ANNOTATION COMMENT IMP: NORMAL
BARBITURATES SERPL QL SCN: NEGATIVE NG/ML
BENZODIAZ SERPL QL SCN: NEGATIVE NG/ML
BUPRENORPHINE SERPL-MCNC: NEGATIVE NG/ML
CANNABINOIDS SERPL QL SCN: POSITIVE NG/ML
COCAINE SERPL QL SCN: NEGATIVE NG/ML
METHADONE SERPL QL SCN: NEGATIVE NG/ML
METHAMPHET SERPL QL: NEGATIVE NG/ML
NIL(NEG) CONTROL SPOT COUNT: NORMAL
OPIATES SERPL QL SCN: NEGATIVE NG/ML
OXYCODONE SERPL QL: NEGATIVE NG/ML
PANEL A SPOT COUNT: 0
PANEL B SPOT COUNT: 0
PCP SERPL QL SCN: NEGATIVE NG/ML
POS CONTROL SPOT COUNT: NORMAL
PSA FREE MFR SERPL: 44 %
PSA FREE SERPL-MCNC: 0.4 NG/ML
PSA SERPL IA-MCNC: 0.9 NG/ML (ref 0–4)
T-SPOT. TB INTERPRETATION: NEGATIVE

## 2025-01-21 ENCOUNTER — LAB REQUISITION (OUTPATIENT)
Dept: LAB | Facility: CLINIC | Age: 65
End: 2025-01-21
Payer: MEDICARE

## 2025-01-21 DIAGNOSIS — N18.6 END STAGE RENAL DISEASE (MULTI): ICD-10-CM

## 2025-01-21 LAB
FLOW AUTOCROSSMATCH: NORMAL
HLA CLASS I AB SCREEN,FC: NORMAL
HLA CLASS II AB SCREEN,FC: NORMAL
HLA CLS I TYP PNL BLD/T DONR HIGH RES: NORMAL
HLA RESULTS: NORMAL
HLA-DP2 QL: NORMAL
HLA-DQB1 HIGH RES: NORMAL
HLA-DRB1 HIGH RES: NORMAL

## 2025-01-21 ASSESSMENT — PATIENT HEALTH QUESTIONNAIRE - PHQ9
6. FEELING BAD ABOUT YOURSELF - OR THAT YOU ARE A FAILURE OR HAVE LET YOURSELF OR YOUR FAMILY DOWN: NOT AT ALL
8. MOVING OR SPEAKING SO SLOWLY THAT OTHER PEOPLE COULD HAVE NOTICED. OR THE OPPOSITE, BEING SO FIGETY OR RESTLESS THAT YOU HAVE BEEN MOVING AROUND A LOT MORE THAN USUAL: NOT AT ALL
4. FEELING TIRED OR HAVING LITTLE ENERGY: MORE THAN HALF THE DAYS
2. FEELING DOWN, DEPRESSED OR HOPELESS: NOT AT ALL
1. LITTLE INTEREST OR PLEASURE IN DOING THINGS: NOT AT ALL
SUM OF ALL RESPONSES TO PHQ9 QUESTIONS 1 & 2: 0
5. POOR APPETITE OR OVEREATING: MORE THAN HALF THE DAYS
3. TROUBLE FALLING OR STAYING ASLEEP OR SLEEPING TOO MUCH: SEVERAL DAYS
7. TROUBLE CONCENTRATING ON THINGS, SUCH AS READING THE NEWSPAPER OR WATCHING TELEVISION: SEVERAL DAYS
SUM OF ALL RESPONSES TO PHQ QUESTIONS 1-9: 6
9. THOUGHTS THAT YOU WOULD BE BETTER OFF DEAD, OR OF HURTING YOURSELF: NOT AT ALL

## 2025-01-21 ASSESSMENT — ANXIETY QUESTIONNAIRES
4. TROUBLE RELAXING: SEVERAL DAYS
5. BEING SO RESTLESS THAT IT IS HARD TO SIT STILL: NOT AT ALL
7. FEELING AFRAID AS IF SOMETHING AWFUL MIGHT HAPPEN: NOT AT ALL
2. NOT BEING ABLE TO STOP OR CONTROL WORRYING: NOT AT ALL
GAD7 TOTAL SCORE: 2
6. BECOMING EASILY ANNOYED OR IRRITABLE: SEVERAL DAYS
1. FEELING NERVOUS, ANXIOUS, OR ON EDGE: NOT AT ALL
3. WORRYING TOO MUCH ABOUT DIFFERENT THINGS: NOT AT ALL

## 2025-01-22 ENCOUNTER — TELEPHONE (OUTPATIENT)
Facility: HOSPITAL | Age: 65
End: 2025-01-22
Payer: MEDICARE

## 2025-01-22 LAB
CANNABINOIDS SERPL-MCNC: 8 NG/ML
CMV IGG AVIDITY SERPL IA-RTO: NONREACTIVE %
COTININE SERPL-MCNC: <5 NG/ML
FLOW AUTOCROSSMATCH: NORMAL
HLA RESULTS: NORMAL
NICOTINE SERPL-MCNC: <5 NG/ML

## 2025-01-23 ENCOUNTER — TELEPHONE (OUTPATIENT)
Facility: HOSPITAL | Age: 65
End: 2025-01-23
Payer: MEDICARE

## 2025-01-28 ENCOUNTER — DOCUMENTATION (OUTPATIENT)
Dept: TRANSPLANT | Facility: HOSPITAL | Age: 65
End: 2025-01-28
Payer: MEDICARE

## 2025-01-28 ENCOUNTER — APPOINTMENT (OUTPATIENT)
Facility: HOSPITAL | Age: 65
End: 2025-01-28
Payer: MEDICARE

## 2025-01-28 NOTE — PROGRESS NOTES
Spoke to pt on the phone for insurance appointment. Traditional Medicare active with part D RX coverage. Per pt he also has sup plan G with [employer]. Pt is from [place]. Spouse provided [employer] info. Pt understands AR meds will be covered by Medicare part B at 80%.  Discussed part D co-pays. Pt has been on dialysis for 7 months. Patient had no insurance concerns; provide pt with C contact info.

## 2025-01-29 ENCOUNTER — DOCUMENTATION (OUTPATIENT)
Facility: HOSPITAL | Age: 65
End: 2025-01-29
Payer: MEDICARE

## 2025-01-29 LAB
HLA CLASS I AB SCREEN,FC: NORMAL
HLA CLASS II AB SCREEN,FC: NORMAL
HLA RESULTS: NORMAL

## 2025-01-29 NOTE — PROGRESS NOTES
Pt was reviewed at Patient Review Meeting 1/23.  Per meeting, Dr. Rosales is requesting pt see cardiology here for risk stratification.  I notified pt today, who was frustrated that he would need to be scheduled for appt because he completed other testing in [place].  I let him know our center was still requesting he be evaluated by cardiology.  He verbalized understanding.  I will also ask PACS to import his CT AP.

## 2025-01-30 ENCOUNTER — TELEPHONE (OUTPATIENT)
Facility: HOSPITAL | Age: 65
End: 2025-01-30
Payer: MEDICARE

## 2025-01-30 LAB
HLA CLS I TYP PNL BLD/T DONR HIGH RES: NORMAL
HLA RESULTS: NORMAL
HLA-DP2 QL: NORMAL
HLA-DQB1 HIGH RES: NORMAL
HLA-DRB1 HIGH RES: NORMAL

## 2025-02-04 ENCOUNTER — HOSPITAL ENCOUNTER (OUTPATIENT)
Dept: RADIOLOGY | Facility: EXTERNAL LOCATION | Age: 65
Discharge: HOME | End: 2025-02-04

## 2025-02-05 ENCOUNTER — TELEPHONE (OUTPATIENT)
Facility: HOSPITAL | Age: 65
End: 2025-02-05
Payer: MEDICARE

## 2025-02-18 ENCOUNTER — TELEPHONE (OUTPATIENT)
Facility: HOSPITAL | Age: 65
End: 2025-02-18
Payer: MEDICARE

## 2025-02-20 ENCOUNTER — DOCUMENTATION (OUTPATIENT)
Facility: HOSPITAL | Age: 65
End: 2025-02-20
Payer: MEDICARE

## 2025-02-21 NOTE — PROGRESS NOTES
Patient was reviewed at Patient Review Meeting on 02/20/2025 with Dr. Barron and [address].  Per review, patient has not responded to attempts to reach him for cardiology appt.  Unable to reach letter will be sent to patient.

## 2025-02-25 ENCOUNTER — DOCUMENTATION (OUTPATIENT)
Facility: HOSPITAL | Age: 65
End: 2025-02-25
Payer: MEDICARE

## 2025-03-06 ENCOUNTER — TELEPHONE (OUTPATIENT)
Facility: HOSPITAL | Age: 65
End: 2025-03-06
Payer: MEDICARE

## 2025-03-06 ENCOUNTER — DOCUMENTATION (OUTPATIENT)
Facility: HOSPITAL | Age: 65
End: 2025-03-06
Payer: MEDICARE

## 2025-03-06 NOTE — PROGRESS NOTES
"Pt left  requesting I return his call.  Returned call and explained we had been trying to reach him to schedule cardiology appt.  Pt apologized for not being responsive.  Transferred pt to  to schedule appt.  While on phone, he requested to speak with me again.  Pt had questions about purpose of visit since he had completed cardiac testing locally.  Explained that as a part of his evaluation, he needs to meet with cardiologist to review his testing and determine if anything further needed.  Pt replied, \"so this is more of an interview?\"  Replied that yes, it was a consult to do an in person assessment, review his testing, and determine if he was appropriate to proceed from a cardiac testing.  Pt replied \"that's interesting\" and said \"okay.\"  Transferred him back to be scheduled with cardiology.  "

## 2025-04-28 PROBLEM — I15.1 HYPERTENSION SECONDARY TO OTHER RENAL DISORDERS: Status: ACTIVE | Noted: 2024-04-18

## 2025-04-28 PROBLEM — E11.3393 MODERATE NONPROLIFERATIVE DIABETIC RETINOPATHY OF BOTH EYES: Status: ACTIVE | Noted: 2019-02-25

## 2025-04-28 PROBLEM — D63.1 ANEMIA ASSOCIATED WITH STAGE 4 CHRONIC RENAL FAILURE: Chronic | Status: ACTIVE | Noted: 2020-08-23

## 2025-04-28 PROBLEM — K35.80 ACUTE APPENDICITIS: Status: ACTIVE | Noted: 2018-04-05

## 2025-04-28 PROBLEM — T78.40XA ALLERGY, UNSPECIFIED, INITIAL ENCOUNTER: Status: ACTIVE | Noted: 2025-04-21

## 2025-04-28 PROBLEM — H35.341 MACULAR CYST, HOLE, OR PSEUDOHOLE, RIGHT EYE: Status: ACTIVE | Noted: 2019-02-25

## 2025-04-28 PROBLEM — E11.22 TYPE 2 DIABETES MELLITUS WITH DIABETIC CHRONIC KIDNEY DISEASE: Status: ACTIVE | Noted: 2024-04-18

## 2025-04-28 PROBLEM — G47.30 SLEEP APNEA: Status: ACTIVE | Noted: 2025-04-28

## 2025-04-28 PROBLEM — R06.00 DYSPNEA: Status: ACTIVE | Noted: 2020-04-05

## 2025-04-28 PROBLEM — R80.9 PROTEINURIA: Status: ACTIVE | Noted: 2020-02-26

## 2025-04-28 PROBLEM — I50.9 CHF (CONGESTIVE HEART FAILURE): Status: ACTIVE | Noted: 2020-04-08

## 2025-04-28 PROBLEM — Z99.2 DEPENDENCE ON RENAL DIALYSIS (CMS-HCC): Status: ACTIVE | Noted: 2024-04-18

## 2025-04-28 PROBLEM — I1A.0 RESISTANT HYPERTENSION: Status: ACTIVE | Noted: 2024-04-18

## 2025-04-28 PROBLEM — N18.4 ANEMIA ASSOCIATED WITH STAGE 4 CHRONIC RENAL FAILURE: Chronic | Status: ACTIVE | Noted: 2020-08-23

## 2025-04-28 PROBLEM — J18.9 PNEUMONIA: Status: ACTIVE | Noted: 2020-04-08

## 2025-04-28 PROBLEM — Z79.85 LONG-TERM (CURRENT) USE OF INJECTABLE NON-INSULIN ANTIDIABETIC DRUGS: Status: ACTIVE | Noted: 2024-04-18

## 2025-04-28 PROBLEM — E83.30 DISORDER OF PHOSPHORUS METABOLISM, UNSPECIFIED: Status: ACTIVE | Noted: 2024-07-24

## 2025-04-28 PROBLEM — H35.372 PUCKERING OF MACULA, LEFT EYE: Status: ACTIVE | Noted: 2019-02-25

## 2025-04-28 PROBLEM — D50.9 IRON DEFICIENCY ANEMIA, UNSPECIFIED: Status: ACTIVE | Noted: 2024-04-16

## 2025-04-28 PROBLEM — T78.2XXA ANAPHYLACTIC SHOCK, UNSPECIFIED, INITIAL ENCOUNTER: Status: ACTIVE | Noted: 2025-04-21

## 2025-04-28 PROBLEM — N25.81 SECONDARY HYPERPARATHYROIDISM OF RENAL ORIGIN (MULTI): Status: ACTIVE | Noted: 2024-04-16

## 2025-04-28 PROBLEM — D68.9 COAGULATION DEFECT, UNSPECIFIED: Status: ACTIVE | Noted: 2024-04-16

## 2025-04-28 PROBLEM — I25.10 CAD (CORONARY ARTERY DISEASE): Chronic | Status: ACTIVE | Noted: 2024-04-16

## 2025-04-28 PROBLEM — N18.4 CKD (CHRONIC KIDNEY DISEASE) STAGE 4, GFR 15-29 ML/MIN (MULTI): Chronic | Status: ACTIVE | Noted: 2019-08-21

## 2025-04-28 PROBLEM — R52 PAIN, UNSPECIFIED: Status: ACTIVE | Noted: 2024-04-16

## 2025-04-28 PROBLEM — R94.39 POSITIVE CARDIAC STRESS TEST: Status: ACTIVE | Noted: 2024-05-01

## 2025-04-28 PROBLEM — N18.6 END-STAGE RENAL DISEASE (MULTI): Status: ACTIVE | Noted: 2021-11-01

## 2025-05-01 ENCOUNTER — COMMITTEE REVIEW (OUTPATIENT)
Facility: HOSPITAL | Age: 65
End: 2025-05-01
Payer: MEDICARE

## 2025-05-02 NOTE — COMMITTEE REVIEW
Evaluation Date: 1/17/2025   Committee Review Date: 5/1/2025   Organ being evaluated for: Kidney     Transplant Phase:  Evaluation   Transplant Status: Active     Referring Physician:     Transplant Physician: Anuradha Hugo     Primary Diagnosis:      Committee Members:   Emma Alvarado, NIK, LD   Danilo Alvarado; Charisma Mata   Pharmacy Flores Pierce, PharmD   Psychology Virgie Hampton, PhD    [facility]   Transplant Giacomo Joel; Jesika Hamilton, ALFONSO; Jackson, Colletta, ALFONSO; Dhara Borrego, ALFONSO; Heena Pina, ALFONSO; Michelle Street RN   Transplant Nephrology Norma Ng MD; Jesus Mcrae MD; Lalita Gr MD   Transplant Surgery Refugio Lima MD; Malissa Rosales MD; Stuart Barron MD       Eligibility:  None     Relative contraindications:  None     Absolute contraindications:  None         Committee Review Decision:    The candidate's evaluation was presented and discussed at the Transplant Multidisciplinary Selection Conference. After review of the candidate's diagnosis and the evaluations of the multidisciplinary team members, the committee made the following recommendations:     Close the evaluation due to the reason below. Patient can be re-referred once the condition is resolved.    Demonstrated non-adherence with medical care:      Resolution: Close evaluation due to not following through on required testing for evaluation.

## 2025-05-08 ENCOUNTER — DOCUMENTATION (OUTPATIENT)
Facility: HOSPITAL | Age: 65
End: 2025-05-08
Payer: MEDICARE